# Patient Record
(demographics unavailable — no encounter records)

---

## 2025-07-01 NOTE — DISCUSSION/SUMMARY
[FreeTextEntry1] : start MVI with folic acid, timing of intercourse reviewed. In her case if no pregnancy after 6 months notify me.

## 2025-07-01 NOTE — PHYSICAL EXAM
[Appropriately responsive] : appropriately responsive [Alert] : alert [No Acute Distress] : no acute distress [Soft] : soft [Non-tender] : non-tender [Examination Of The Breasts] : a normal appearance [No Masses] : no breast masses were palpable [Labia Majora] : normal [Labia Minora] : normal [Normal] : normal [Uterine Adnexae] : non-palpable [No Bleeding] : There was no active vaginal bleeding [Tenderness] : nontender [Enlarged ___ wks] : not enlarged [Mass ___ cm] : no uterine mass was palpated [FreeTextEntry4] : old blood [FreeTextEntry5] : pap done

## 2025-07-01 NOTE — HISTORY OF PRESENT ILLNESS
[Patient reported PAP Smear was normal] : Patient reported PAP Smear was normal [Currently Active] : currently active [Men] : men [Vaginal] : vaginal [Condoms] : Condoms [TextBox_4] : She is getting  next month then going to try to get pregnant. menses q 24-26 days, spots for 6-7 days before period, period lasts 5 days. Pt had normal sono, no infections. She is concerned about whether this will be an issue for her getting pregnant. [PapSmeardate] : 6/2024 [ColonoscopyDate] : 11/2023 [No] : Patient does not have concerns regarding sex

## 2025-07-17 NOTE — PHYSICAL EXAM
[Appropriately responsive] : appropriately responsive [Alert] : alert [No Acute Distress] : no acute distress [Labia Majora] : normal [Labia Minora] : normal [No Bleeding] : There was no active vaginal bleeding [Normal] : normal [FreeTextEntry5] : pap done